# Patient Record
Sex: FEMALE | Race: WHITE | NOT HISPANIC OR LATINO | Employment: OTHER | URBAN - METROPOLITAN AREA
[De-identification: names, ages, dates, MRNs, and addresses within clinical notes are randomized per-mention and may not be internally consistent; named-entity substitution may affect disease eponyms.]

---

## 2018-04-09 ENCOUNTER — ESTABLISHED COMPREHENSIVE EXAM (OUTPATIENT)
Dept: URBAN - METROPOLITAN AREA CLINIC 40 | Facility: CLINIC | Age: 58
End: 2018-04-09

## 2018-04-09 DIAGNOSIS — H52.4: ICD-10-CM

## 2018-04-09 DIAGNOSIS — H52.203: ICD-10-CM

## 2018-04-09 DIAGNOSIS — H52.03: ICD-10-CM

## 2018-04-09 PROCEDURE — 92015 DETERMINE REFRACTIVE STATE: CPT

## 2018-04-09 PROCEDURE — 92499OP2 OPTOMAP RETINAL SCREENING BOTH EYES

## 2018-04-09 PROCEDURE — 92014 COMPRE OPH EXAM EST PT 1/>: CPT

## 2018-04-09 ASSESSMENT — KERATOMETRY
OD_AXISANGLE2_DEGREES: 095
OS_K1POWER_DIOPTERS: 48.00
OS_AXISANGLE2_DEGREES: 053
OD_K1POWER_DIOPTERS: 47.75
OD_AXISANGLE_DEGREES: 005
OS_K2POWER_DIOPTERS: 47.25
OS_AXISANGLE_DEGREES: 143
OD_K2POWER_DIOPTERS: 46.75

## 2018-04-09 ASSESSMENT — VISUAL ACUITY
OU_CC: J1
OD_CC: J1
OD_CC: 20/20
OS_CC: 20/25
OU_CC: 20/20
OU_SC: 20/25
OS_CC: J1

## 2018-04-09 ASSESSMENT — TONOMETRY
OS_IOP_MMHG: 16
OD_IOP_MMHG: 16

## 2018-04-16 ENCOUNTER — APPOINTMENT (RX ONLY)
Dept: URBAN - METROPOLITAN AREA CLINIC 48 | Facility: CLINIC | Age: 58
Setting detail: DERMATOLOGY
End: 2018-04-16

## 2018-04-16 ENCOUNTER — RX ONLY (OUTPATIENT)
Age: 58
Setting detail: RX ONLY
End: 2018-04-16

## 2018-04-16 DIAGNOSIS — L82.1 OTHER SEBORRHEIC KERATOSIS: ICD-10-CM

## 2018-04-16 DIAGNOSIS — D22 MELANOCYTIC NEVI: ICD-10-CM

## 2018-04-16 DIAGNOSIS — D18.0 HEMANGIOMA: ICD-10-CM

## 2018-04-16 DIAGNOSIS — L57.8 OTHER SKIN CHANGES DUE TO CHRONIC EXPOSURE TO NONIONIZING RADIATION: ICD-10-CM

## 2018-04-16 DIAGNOSIS — Z87.2 PERSONAL HISTORY OF DISEASES OF THE SKIN AND SUBCUTANEOUS TISSUE: ICD-10-CM

## 2018-04-16 DIAGNOSIS — L55.9 SUNBURN, UNSPECIFIED: ICD-10-CM

## 2018-04-16 PROBLEM — D48.5 NEOPLASM OF UNCERTAIN BEHAVIOR OF SKIN: Status: ACTIVE | Noted: 2018-04-16

## 2018-04-16 PROBLEM — D18.01 HEMANGIOMA OF SKIN AND SUBCUTANEOUS TISSUE: Status: ACTIVE | Noted: 2018-04-16

## 2018-04-16 PROBLEM — L70.0 ACNE VULGARIS: Status: ACTIVE | Noted: 2018-04-16

## 2018-04-16 PROBLEM — D22.5 MELANOCYTIC NEVI OF TRUNK: Status: ACTIVE | Noted: 2018-04-16

## 2018-04-16 PROCEDURE — ? COUNSELING

## 2018-04-16 PROCEDURE — 99203 OFFICE O/P NEW LOW 30 MIN: CPT

## 2018-04-16 ASSESSMENT — LOCATION SIMPLE DESCRIPTION DERM
LOCATION SIMPLE: LEFT BREAST
LOCATION SIMPLE: RIGHT SHOULDER
LOCATION SIMPLE: RIGHT FOREHEAD
LOCATION SIMPLE: ABDOMEN
LOCATION SIMPLE: RIGHT BREAST

## 2018-04-16 ASSESSMENT — LOCATION DETAILED DESCRIPTION DERM
LOCATION DETAILED: RIGHT MEDIAL BREAST 12-1:00 REGION
LOCATION DETAILED: LEFT LATERAL ABDOMEN
LOCATION DETAILED: RIGHT ANTERIOR SHOULDER
LOCATION DETAILED: EPIGASTRIC SKIN
LOCATION DETAILED: RIGHT INFERIOR MEDIAL FOREHEAD
LOCATION DETAILED: LEFT MEDIAL BREAST 11-12:00 REGION
LOCATION DETAILED: PERIUMBILICAL SKIN

## 2018-04-16 ASSESSMENT — LOCATION ZONE DERM
LOCATION ZONE: ARM
LOCATION ZONE: TRUNK
LOCATION ZONE: FACE

## 2018-10-16 ENCOUNTER — APPOINTMENT (RX ONLY)
Dept: URBAN - METROPOLITAN AREA CLINIC 48 | Facility: CLINIC | Age: 58
Setting detail: DERMATOLOGY
End: 2018-10-16

## 2018-10-16 DIAGNOSIS — D22 MELANOCYTIC NEVI: ICD-10-CM

## 2018-10-16 DIAGNOSIS — L57.8 OTHER SKIN CHANGES DUE TO CHRONIC EXPOSURE TO NONIONIZING RADIATION: ICD-10-CM

## 2018-10-16 DIAGNOSIS — L82.1 OTHER SEBORRHEIC KERATOSIS: ICD-10-CM

## 2018-10-16 DIAGNOSIS — D18.0 HEMANGIOMA: ICD-10-CM

## 2018-10-16 PROBLEM — D22.5 MELANOCYTIC NEVI OF TRUNK: Status: ACTIVE | Noted: 2018-10-16

## 2018-10-16 PROBLEM — D18.01 HEMANGIOMA OF SKIN AND SUBCUTANEOUS TISSUE: Status: ACTIVE | Noted: 2018-10-16

## 2018-10-16 PROCEDURE — 99214 OFFICE O/P EST MOD 30 MIN: CPT

## 2018-10-16 PROCEDURE — ? COUNSELING

## 2018-10-16 ASSESSMENT — LOCATION DETAILED DESCRIPTION DERM
LOCATION DETAILED: EPIGASTRIC SKIN
LOCATION DETAILED: RIGHT INFERIOR MEDIAL FOREHEAD
LOCATION DETAILED: PERIUMBILICAL SKIN
LOCATION DETAILED: LEFT LATERAL ABDOMEN

## 2018-10-16 ASSESSMENT — LOCATION ZONE DERM
LOCATION ZONE: TRUNK
LOCATION ZONE: FACE

## 2018-10-16 ASSESSMENT — LOCATION SIMPLE DESCRIPTION DERM
LOCATION SIMPLE: ABDOMEN
LOCATION SIMPLE: RIGHT FOREHEAD

## 2019-07-16 ENCOUNTER — ESTABLISHED COMPREHENSIVE EXAM (OUTPATIENT)
Dept: URBAN - METROPOLITAN AREA CLINIC 40 | Facility: CLINIC | Age: 59
End: 2019-07-16

## 2019-07-16 DIAGNOSIS — H52.4: ICD-10-CM

## 2019-07-16 DIAGNOSIS — H52.203: ICD-10-CM

## 2019-07-16 DIAGNOSIS — H52.03: ICD-10-CM

## 2019-07-16 PROCEDURE — 92015 DETERMINE REFRACTIVE STATE: CPT

## 2019-07-16 PROCEDURE — 92014 COMPRE OPH EXAM EST PT 1/>: CPT

## 2019-07-16 ASSESSMENT — KERATOMETRY
OS_K1POWER_DIOPTERS: 48
OS_AXISANGLE2_DEGREES: 73
OD_AXISANGLE2_DEGREES: 102
OS_AXISANGLE2_DEGREES: 053
OD_K1POWER_DIOPTERS: 47.75
OD_AXISANGLE_DEGREES: 12
OS_K2POWER_DIOPTERS: 47
OD_AXISANGLE_DEGREES: 005
OS_K1POWER_DIOPTERS: 48.00
OD_K2POWER_DIOPTERS: 46.75
OS_AXISANGLE_DEGREES: 143
OS_K2POWER_DIOPTERS: 47.25
OD_AXISANGLE2_DEGREES: 095
OS_AXISANGLE_DEGREES: 163
OD_K2POWER_DIOPTERS: 46.5

## 2019-07-16 ASSESSMENT — VISUAL ACUITY
OD_CC: 20/25
OS_CC: 20/20
OS_CC: J1+
OS_SC: 20/30
OU_CC: J1+
OD_SC: 20/40
OU_CC: 20/20-1
OU_SC: 20/25
OD_CC: J2

## 2019-07-16 ASSESSMENT — TONOMETRY
OD_IOP_MMHG: 16
OS_IOP_MMHG: 16

## 2020-02-11 ENCOUNTER — APPOINTMENT (RX ONLY)
Dept: URBAN - METROPOLITAN AREA CLINIC 48 | Facility: CLINIC | Age: 60
Setting detail: DERMATOLOGY
End: 2020-02-11

## 2020-02-11 DIAGNOSIS — D22 MELANOCYTIC NEVI: ICD-10-CM

## 2020-02-11 DIAGNOSIS — L57.8 OTHER SKIN CHANGES DUE TO CHRONIC EXPOSURE TO NONIONIZING RADIATION: ICD-10-CM

## 2020-02-11 DIAGNOSIS — D18.0 HEMANGIOMA: ICD-10-CM

## 2020-02-11 DIAGNOSIS — L82.1 OTHER SEBORRHEIC KERATOSIS: ICD-10-CM

## 2020-02-11 PROBLEM — D22.5 MELANOCYTIC NEVI OF TRUNK: Status: ACTIVE | Noted: 2020-02-11

## 2020-02-11 PROBLEM — D18.01 HEMANGIOMA OF SKIN AND SUBCUTANEOUS TISSUE: Status: ACTIVE | Noted: 2020-02-11

## 2020-02-11 PROBLEM — D23.5 OTHER BENIGN NEOPLASM OF SKIN OF TRUNK: Status: ACTIVE | Noted: 2020-02-11

## 2020-02-11 PROCEDURE — ? COUNSELING

## 2020-02-11 PROCEDURE — 99214 OFFICE O/P EST MOD 30 MIN: CPT

## 2020-02-11 ASSESSMENT — LOCATION DETAILED DESCRIPTION DERM
LOCATION DETAILED: EPIGASTRIC SKIN
LOCATION DETAILED: LEFT LATERAL ABDOMEN
LOCATION DETAILED: RIGHT INFERIOR MEDIAL FOREHEAD
LOCATION DETAILED: PERIUMBILICAL SKIN

## 2020-02-11 ASSESSMENT — LOCATION ZONE DERM
LOCATION ZONE: TRUNK
LOCATION ZONE: FACE

## 2020-02-11 ASSESSMENT — LOCATION SIMPLE DESCRIPTION DERM
LOCATION SIMPLE: RIGHT FOREHEAD
LOCATION SIMPLE: ABDOMEN

## 2021-02-05 ENCOUNTER — PREPPED CHART (OUTPATIENT)
Dept: URBAN - METROPOLITAN AREA CLINIC 40 | Facility: CLINIC | Age: 61
End: 2021-02-05

## 2021-02-05 ASSESSMENT — KERATOMETRY
OS_K1POWER_DIOPTERS: 48
OD_K2POWER_DIOPTERS: 46.75
OD_AXISANGLE2_DEGREES: 095
OS_K2POWER_DIOPTERS: 47.25
OS_K2POWER_DIOPTERS: 47
OD_K2POWER_DIOPTERS: 46.5
OS_AXISANGLE_DEGREES: 143
OS_K1POWER_DIOPTERS: 48.00
OS_AXISANGLE2_DEGREES: 73
OD_AXISANGLE2_DEGREES: 102
OS_AXISANGLE2_DEGREES: 053
OD_AXISANGLE_DEGREES: 12
OD_AXISANGLE_DEGREES: 005
OD_K1POWER_DIOPTERS: 47.75
OS_AXISANGLE_DEGREES: 163

## 2021-02-16 ENCOUNTER — APPOINTMENT (RX ONLY)
Dept: URBAN - METROPOLITAN AREA CLINIC 48 | Facility: CLINIC | Age: 61
Setting detail: DERMATOLOGY
End: 2021-02-16

## 2021-02-16 VITALS — TEMPERATURE: 97.9 F

## 2021-02-16 DIAGNOSIS — L82.1 OTHER SEBORRHEIC KERATOSIS: ICD-10-CM

## 2021-02-16 DIAGNOSIS — L57.8 OTHER SKIN CHANGES DUE TO CHRONIC EXPOSURE TO NONIONIZING RADIATION: ICD-10-CM

## 2021-02-16 DIAGNOSIS — D22 MELANOCYTIC NEVI: ICD-10-CM

## 2021-02-16 DIAGNOSIS — D18.0 HEMANGIOMA: ICD-10-CM

## 2021-02-16 DIAGNOSIS — L82.0 INFLAMED SEBORRHEIC KERATOSIS: ICD-10-CM

## 2021-02-16 DIAGNOSIS — L85.3 XEROSIS CUTIS: ICD-10-CM

## 2021-02-16 PROBLEM — D22.5 MELANOCYTIC NEVI OF TRUNK: Status: ACTIVE | Noted: 2021-02-16

## 2021-02-16 PROBLEM — D18.01 HEMANGIOMA OF SKIN AND SUBCUTANEOUS TISSUE: Status: ACTIVE | Noted: 2021-02-16

## 2021-02-16 PROCEDURE — ? COUNSELING

## 2021-02-16 PROCEDURE — 99214 OFFICE O/P EST MOD 30 MIN: CPT | Mod: 25

## 2021-02-16 PROCEDURE — ? PRESCRIPTION MEDICATION MANAGEMENT

## 2021-02-16 PROCEDURE — 17110 DESTRUCTION B9 LES UP TO 14: CPT

## 2021-02-16 PROCEDURE — ? SUNSCREEN RECOMMENDATIONS

## 2021-02-16 PROCEDURE — ? LIQUID NITROGEN

## 2021-02-16 ASSESSMENT — LOCATION SIMPLE DESCRIPTION DERM
LOCATION SIMPLE: ABDOMEN
LOCATION SIMPLE: LEFT CHEEK
LOCATION SIMPLE: RIGHT FOREHEAD

## 2021-02-16 ASSESSMENT — LOCATION DETAILED DESCRIPTION DERM
LOCATION DETAILED: EPIGASTRIC SKIN
LOCATION DETAILED: LEFT SUPERIOR LATERAL MALAR CHEEK
LOCATION DETAILED: PERIUMBILICAL SKIN
LOCATION DETAILED: RIGHT INFERIOR MEDIAL FOREHEAD
LOCATION DETAILED: LEFT LATERAL ABDOMEN

## 2021-02-16 ASSESSMENT — LOCATION ZONE DERM
LOCATION ZONE: FACE
LOCATION ZONE: TRUNK

## 2021-02-16 NOTE — PROCEDURE: MIPS QUALITY
Quality 130: Documentation Of Current Medications In The Medical Record: Current Medications Documented
Additional Notes: Underage for pneumonia vaccine
Quality 111:Pneumonia Vaccination Status For Older Adults: Pneumococcal Vaccination not Administered or Previously Received, Reason not Otherwise Specified
Detail Level: Detailed
Quality 226: Preventive Care And Screening: Tobacco Use: Screening And Cessation Intervention: Patient screened for tobacco use and is an ex/non-smoker

## 2021-02-16 NOTE — PROCEDURE: PRESCRIPTION MEDICATION MANAGEMENT
Plan: Discussed otc Amlactin vs RX LacHydrin \\nWill call if wants rx\\nDiscussed that this can smooth SKs
Render In Strict Bullet Format?: No
Detail Level: Zone

## 2021-04-27 ENCOUNTER — ESTABLISHED COMPREHENSIVE EXAM (OUTPATIENT)
Dept: URBAN - METROPOLITAN AREA CLINIC 40 | Facility: CLINIC | Age: 61
End: 2021-04-27

## 2021-04-27 DIAGNOSIS — D31.31: ICD-10-CM

## 2021-04-27 DIAGNOSIS — H52.4: ICD-10-CM

## 2021-04-27 DIAGNOSIS — H52.203: ICD-10-CM

## 2021-04-27 DIAGNOSIS — H52.03: ICD-10-CM

## 2021-04-27 DIAGNOSIS — H04.123: ICD-10-CM

## 2021-04-27 PROCEDURE — 92015 DETERMINE REFRACTIVE STATE: CPT

## 2021-04-27 PROCEDURE — 92499OP2 OPTOMAP RETINAL SCREENING BOTH EYES

## 2021-04-27 PROCEDURE — 92014 COMPRE OPH EXAM EST PT 1/>: CPT

## 2021-04-27 ASSESSMENT — KERATOMETRY
OD_K2POWER_DIOPTERS: 46.5
OS_AXISANGLE2_DEGREES: 73
OD_AXISANGLE_DEGREES: 12
OS_K2POWER_DIOPTERS: 47.25
OS_AXISANGLE2_DEGREES: 053
OS_K1POWER_DIOPTERS: 48.00
OS_AXISANGLE_DEGREES: 143
OD_AXISANGLE_DEGREES: 005
OS_K1POWER_DIOPTERS: 48
OS_AXISANGLE_DEGREES: 163
OD_K2POWER_DIOPTERS: 46.75
OS_K2POWER_DIOPTERS: 47
OD_AXISANGLE2_DEGREES: 102
OD_K1POWER_DIOPTERS: 47.75
OD_AXISANGLE2_DEGREES: 095

## 2021-04-27 ASSESSMENT — TONOMETRY
OD_IOP_MMHG: 14
OS_IOP_MMHG: 14

## 2021-04-27 ASSESSMENT — VISUAL ACUITY
OU_CC: 20/20
OS_CC: J3
OD_CC: 20/25
OU_CC: J2
OD_CC: J3
OS_CC: 20/25

## 2022-02-08 ENCOUNTER — APPOINTMENT (RX ONLY)
Dept: URBAN - METROPOLITAN AREA CLINIC 48 | Facility: CLINIC | Age: 62
Setting detail: DERMATOLOGY
End: 2022-02-08

## 2022-02-08 DIAGNOSIS — D22 MELANOCYTIC NEVI: ICD-10-CM

## 2022-02-08 DIAGNOSIS — L82.1 OTHER SEBORRHEIC KERATOSIS: ICD-10-CM

## 2022-02-08 DIAGNOSIS — L57.8 OTHER SKIN CHANGES DUE TO CHRONIC EXPOSURE TO NONIONIZING RADIATION: ICD-10-CM

## 2022-02-08 DIAGNOSIS — D18.0 HEMANGIOMA: ICD-10-CM

## 2022-02-08 PROBLEM — D22.5 MELANOCYTIC NEVI OF TRUNK: Status: ACTIVE | Noted: 2022-02-08

## 2022-02-08 PROBLEM — D18.01 HEMANGIOMA OF SKIN AND SUBCUTANEOUS TISSUE: Status: ACTIVE | Noted: 2022-02-08

## 2022-02-08 PROCEDURE — ? COUNSELING

## 2022-02-08 PROCEDURE — ? SUNSCREEN RECOMMENDATIONS

## 2022-02-08 PROCEDURE — 99213 OFFICE O/P EST LOW 20 MIN: CPT

## 2022-02-08 ASSESSMENT — LOCATION DETAILED DESCRIPTION DERM
LOCATION DETAILED: EPIGASTRIC SKIN
LOCATION DETAILED: RIGHT INFERIOR MEDIAL FOREHEAD
LOCATION DETAILED: LEFT LATERAL ABDOMEN
LOCATION DETAILED: PERIUMBILICAL SKIN

## 2022-02-08 ASSESSMENT — LOCATION ZONE DERM
LOCATION ZONE: FACE
LOCATION ZONE: TRUNK

## 2022-02-08 ASSESSMENT — LOCATION SIMPLE DESCRIPTION DERM
LOCATION SIMPLE: RIGHT FOREHEAD
LOCATION SIMPLE: ABDOMEN

## 2022-02-08 NOTE — PROCEDURE: SUNSCREEN RECOMMENDATIONS
Detail Level: Generalized
Products Recommended: Any otc sunscreen containing Zinc or Titanium.
General Sunscreen Counseling: I recommended a broad spectrum sunscreen with a SPF of 30 or higher. I explained that SPF 30 sunscreens block approximately 97 percent of the sun's harmful rays. Sunscreens should be applied at least 15 minutes prior to expected sun exposure and then every 2 hours after that as long as sun exposure continues. If swimming or exercising sunscreen should be reapplied every 45 minutes to an hour after getting wet or sweating. One ounce, or the equivalent of a shot glass full of sunscreen, is adequate to protect the skin not covered by a bathing suit. I also recommended a lip balm with a sunscreen as well. Sun protective clothing can be used in lieu of sunscreen but must be worn the entire time you are exposed to the sun's rays.

## 2022-05-03 ENCOUNTER — COMPREHENSIVE EXAM (OUTPATIENT)
Dept: URBAN - METROPOLITAN AREA CLINIC 40 | Facility: CLINIC | Age: 62
End: 2022-05-03

## 2022-05-03 DIAGNOSIS — H52.4: ICD-10-CM

## 2022-05-03 DIAGNOSIS — H04.123: ICD-10-CM

## 2022-05-03 DIAGNOSIS — H52.203: ICD-10-CM

## 2022-05-03 DIAGNOSIS — H52.03: ICD-10-CM

## 2022-05-03 DIAGNOSIS — D31.31: ICD-10-CM

## 2022-05-03 PROCEDURE — 92015 DETERMINE REFRACTIVE STATE: CPT

## 2022-05-03 PROCEDURE — 92014 COMPRE OPH EXAM EST PT 1/>: CPT

## 2022-05-03 ASSESSMENT — KERATOMETRY
OS_K1POWER_DIOPTERS: 48
OD_K1POWER_DIOPTERS: 46.75
OS_AXISANGLE_DEGREES: 65
OS_K1POWER_DIOPTERS: 48.00
OD_AXISANGLE2_DEGREES: 102
OS_K2POWER_DIOPTERS: 48.00
OS_K1POWER_DIOPTERS: 47.25
OD_K2POWER_DIOPTERS: 46.75
OD_AXISANGLE_DEGREES: 93
OS_AXISANGLE2_DEGREES: 73
OD_AXISANGLE2_DEGREES: 095
OS_AXISANGLE_DEGREES: 163
OS_AXISANGLE2_DEGREES: 155
OD_K2POWER_DIOPTERS: 47.75
OD_AXISANGLE_DEGREES: 12
OD_AXISANGLE_DEGREES: 005
OS_K2POWER_DIOPTERS: 47
OD_K1POWER_DIOPTERS: 47.75
OS_K2POWER_DIOPTERS: 47.25
OD_AXISANGLE2_DEGREES: 003
OS_AXISANGLE2_DEGREES: 053
OS_AXISANGLE_DEGREES: 143
OD_K2POWER_DIOPTERS: 46.5

## 2022-05-03 ASSESSMENT — VISUAL ACUITY
OU_SC: 20/70
OS_SC: 20/70
OD_SC: 20/70
OU_SC: 20/40
OD_SC: 20/40-1
OS_SC: 20/40-1

## 2022-05-03 ASSESSMENT — TONOMETRY
OD_IOP_MMHG: 15
OS_IOP_MMHG: 12

## 2022-05-05 ASSESSMENT — KERATOMETRY
OS_K1POWER_DIOPTERS: 47.25
OD_K2POWER_DIOPTERS: 47.75
OS_AXISANGLE2_DEGREES: 73
OS_K2POWER_DIOPTERS: 48.00
OS_AXISANGLE_DEGREES: 143
OD_AXISANGLE_DEGREES: 12
OD_AXISANGLE2_DEGREES: 003
OS_AXISANGLE2_DEGREES: 053
OD_AXISANGLE2_DEGREES: 095
OD_AXISANGLE2_DEGREES: 102
OS_AXISANGLE_DEGREES: 163
OD_K1POWER_DIOPTERS: 46.75
OS_AXISANGLE2_DEGREES: 155
OS_K2POWER_DIOPTERS: 47
OS_K1POWER_DIOPTERS: 48.00
OD_AXISANGLE_DEGREES: 005
OS_AXISANGLE_DEGREES: 65
OD_AXISANGLE_DEGREES: 93
OD_K2POWER_DIOPTERS: 46.5
OD_K1POWER_DIOPTERS: 47.75
OS_K2POWER_DIOPTERS: 47.25
OD_K2POWER_DIOPTERS: 46.75
OS_K1POWER_DIOPTERS: 48

## 2022-05-06 ENCOUNTER — FOLLOW UP (OUTPATIENT)
Dept: URBAN - METROPOLITAN AREA CLINIC 40 | Facility: CLINIC | Age: 62
End: 2022-05-06

## 2022-05-06 DIAGNOSIS — H02.403: ICD-10-CM

## 2022-05-06 PROCEDURE — 99213 OFFICE O/P EST LOW 20 MIN: CPT

## 2022-05-06 ASSESSMENT — VISUAL ACUITY
OD_PH: 20/20
OS_PH: 20/25
OD_SC: 20/30
OS_SC: 20/40
OU_SC: 20/30

## 2022-05-06 ASSESSMENT — TONOMETRY
OD_IOP_MMHG: 16
OS_IOP_MMHG: 15

## 2022-06-22 ENCOUNTER — ESTABLISHED PATIENT (OUTPATIENT)
Dept: URBAN - METROPOLITAN AREA CLINIC 38 | Facility: CLINIC | Age: 62
End: 2022-06-22

## 2022-06-22 DIAGNOSIS — H02.834: ICD-10-CM

## 2022-06-22 DIAGNOSIS — H02.831: ICD-10-CM

## 2022-06-22 DIAGNOSIS — H02.403: ICD-10-CM

## 2022-06-22 PROCEDURE — 92285 EXTERNAL OCULAR PHOTOGRAPHY: CPT

## 2022-06-22 PROCEDURE — 99213 OFFICE O/P EST LOW 20 MIN: CPT

## 2022-06-22 ASSESSMENT — KERATOMETRY
OS_K2POWER_DIOPTERS: 47.25
OD_K2POWER_DIOPTERS: 46.75
OD_K2POWER_DIOPTERS: 47.75
OD_K2POWER_DIOPTERS: 46.5
OS_AXISANGLE_DEGREES: 143
OS_AXISANGLE2_DEGREES: 73
OD_AXISANGLE_DEGREES: 12
OD_K1POWER_DIOPTERS: 47.75
OD_AXISANGLE_DEGREES: 93
OD_K1POWER_DIOPTERS: 46.75
OS_AXISANGLE_DEGREES: 65
OD_AXISANGLE2_DEGREES: 095
OS_AXISANGLE_DEGREES: 163
OS_AXISANGLE2_DEGREES: 053
OS_AXISANGLE2_DEGREES: 155
OS_K2POWER_DIOPTERS: 47
OS_K1POWER_DIOPTERS: 48
OS_K2POWER_DIOPTERS: 48.00
OS_K1POWER_DIOPTERS: 47.25
OS_K1POWER_DIOPTERS: 48.00
OD_AXISANGLE2_DEGREES: 003
OD_AXISANGLE2_DEGREES: 102
OD_AXISANGLE_DEGREES: 005

## 2022-06-22 ASSESSMENT — VISUAL ACUITY
OD_SC: 20/30
OS_SC: 20/40

## 2022-07-20 ENCOUNTER — TECH ONLY (OUTPATIENT)
Dept: URBAN - METROPOLITAN AREA CLINIC 39 | Facility: CLINIC | Age: 62
End: 2022-07-20

## 2022-07-20 DIAGNOSIS — H02.403: ICD-10-CM

## 2022-07-20 DIAGNOSIS — H02.831: ICD-10-CM

## 2022-07-20 DIAGNOSIS — H02.835: ICD-10-CM

## 2022-07-20 DIAGNOSIS — H02.834: ICD-10-CM

## 2022-07-20 DIAGNOSIS — H02.832: ICD-10-CM

## 2022-07-20 PROCEDURE — 92082 INTERMEDIATE VISUAL FIELD XM: CPT

## 2022-07-20 PROCEDURE — 99211T TECH SERVICE

## 2022-07-20 ASSESSMENT — KERATOMETRY
OD_AXISANGLE2_DEGREES: 102
OS_K2POWER_DIOPTERS: 47.25
OD_K1POWER_DIOPTERS: 47.75
OS_AXISANGLE2_DEGREES: 053
OD_AXISANGLE_DEGREES: 005
OS_K1POWER_DIOPTERS: 47.25
OS_K1POWER_DIOPTERS: 48
OD_K2POWER_DIOPTERS: 46.75
OS_AXISANGLE_DEGREES: 163
OD_AXISANGLE2_DEGREES: 095
OS_AXISANGLE_DEGREES: 143
OS_AXISANGLE_DEGREES: 65
OS_AXISANGLE2_DEGREES: 73
OD_AXISANGLE2_DEGREES: 003
OS_K1POWER_DIOPTERS: 48.00
OD_AXISANGLE_DEGREES: 12
OS_K2POWER_DIOPTERS: 47
OD_AXISANGLE_DEGREES: 93
OD_K2POWER_DIOPTERS: 46.5
OS_AXISANGLE2_DEGREES: 155
OD_K1POWER_DIOPTERS: 46.75
OS_K2POWER_DIOPTERS: 48.00
OD_K2POWER_DIOPTERS: 47.75

## 2022-08-09 ENCOUNTER — APPOINTMENT (RX ONLY)
Dept: URBAN - METROPOLITAN AREA CLINIC 130 | Facility: CLINIC | Age: 62
Setting detail: DERMATOLOGY
End: 2022-08-09

## 2022-08-09 DIAGNOSIS — D22 MELANOCYTIC NEVI: ICD-10-CM

## 2022-08-09 DIAGNOSIS — D49.2 NEOPLASM OF UNSPECIFIED BEHAVIOR OF BONE, SOFT TISSUE, AND SKIN: ICD-10-CM

## 2022-08-09 DIAGNOSIS — L82.1 OTHER SEBORRHEIC KERATOSIS: ICD-10-CM

## 2022-08-09 DIAGNOSIS — D18.0 HEMANGIOMA: ICD-10-CM

## 2022-08-09 DIAGNOSIS — Z87.2 PERSONAL HISTORY OF DISEASES OF THE SKIN AND SUBCUTANEOUS TISSUE: ICD-10-CM

## 2022-08-09 DIAGNOSIS — L57.8 OTHER SKIN CHANGES DUE TO CHRONIC EXPOSURE TO NONIONIZING RADIATION: ICD-10-CM

## 2022-08-09 PROBLEM — D22.5 MELANOCYTIC NEVI OF TRUNK: Status: ACTIVE | Noted: 2022-08-09

## 2022-08-09 PROBLEM — D18.01 HEMANGIOMA OF SKIN AND SUBCUTANEOUS TISSUE: Status: ACTIVE | Noted: 2022-08-09

## 2022-08-09 PROCEDURE — ? DERMTECH: PLA + TERT ADD ON ASSAY

## 2022-08-09 PROCEDURE — ? COUNSELING

## 2022-08-09 PROCEDURE — ? SUNSCREEN RECOMMENDATIONS

## 2022-08-09 PROCEDURE — 99214 OFFICE O/P EST MOD 30 MIN: CPT

## 2022-08-09 ASSESSMENT — LOCATION SIMPLE DESCRIPTION DERM
LOCATION SIMPLE: RIGHT FOREHEAD
LOCATION SIMPLE: ABDOMEN

## 2022-08-09 ASSESSMENT — LOCATION DETAILED DESCRIPTION DERM
LOCATION DETAILED: RIGHT INFERIOR MEDIAL FOREHEAD
LOCATION DETAILED: RIGHT RIB CAGE
LOCATION DETAILED: EPIGASTRIC SKIN
LOCATION DETAILED: PERIUMBILICAL SKIN
LOCATION DETAILED: LEFT LATERAL ABDOMEN

## 2022-08-09 ASSESSMENT — LOCATION ZONE DERM
LOCATION ZONE: TRUNK
LOCATION ZONE: FACE

## 2022-08-09 NOTE — PROCEDURE: DERMTECH: PLA + TERT ADD ON ASSAY
Body Location Override (Optional - Billing Will Still Be Based On Selected Body Map Location If Applicable): right upper abdomen
Detail Level: Detailed
Size Of Lesion In Cm (Optional): 0
Accession #: [id number]
Biopsy Type: RNA and DNA evaluation
Lab: -[id number]
Procedure Details: The first patch was applied to the lesion, adhesive side down, and rubbed in a circular motion 15 times. Following this the lesion was outlined with a permanent marker and the patch was then secured, adhesive side down, in the provided transport case. This process was repeated for patches 2 through 4 and then sent to [address] [place] [address] in the provided self-addressed envelope.
Render Path Notes In Note?: No
Consent: Written consent was obtained and risks were reviewed including but not limited to mild pain and skin irritation.
Post-Care Instructions: I reviewed with the patient in detail post-care instructions.
Notification Instructions: Patient will be notified of biopsy results. However, patient instructed to call the office if not contacted within 2 weeks.
Billing Type: [employer]

## 2022-08-31 ENCOUNTER — PRE-OP/H&P (OUTPATIENT)
Dept: URBAN - METROPOLITAN AREA CLINIC 39 | Facility: CLINIC | Age: 62
End: 2022-08-31

## 2022-08-31 DIAGNOSIS — H02.831: ICD-10-CM

## 2022-08-31 DIAGNOSIS — H02.834: ICD-10-CM

## 2022-08-31 DIAGNOSIS — H02.403: ICD-10-CM

## 2022-08-31 PROCEDURE — 99211HP H&P OFFICE/OUTPATIENT VISIT, EST

## 2022-08-31 ASSESSMENT — KERATOMETRY
OD_K2POWER_DIOPTERS: 46.5
OS_AXISANGLE_DEGREES: 163
OS_K2POWER_DIOPTERS: 48.00
OD_AXISANGLE_DEGREES: 005
OD_AXISANGLE2_DEGREES: 102
OS_K1POWER_DIOPTERS: 48.00
OD_AXISANGLE2_DEGREES: 095
OS_AXISANGLE_DEGREES: 65
OS_AXISANGLE2_DEGREES: 73
OD_AXISANGLE_DEGREES: 12
OD_K2POWER_DIOPTERS: 47.75
OS_K2POWER_DIOPTERS: 47
OD_K1POWER_DIOPTERS: 46.75
OS_K1POWER_DIOPTERS: 48
OS_K2POWER_DIOPTERS: 47.25
OD_K2POWER_DIOPTERS: 46.75
OS_K1POWER_DIOPTERS: 47.25
OS_AXISANGLE_DEGREES: 143
OD_AXISANGLE_DEGREES: 93
OD_AXISANGLE2_DEGREES: 003
OD_K1POWER_DIOPTERS: 47.75
OS_AXISANGLE2_DEGREES: 155
OS_AXISANGLE2_DEGREES: 053

## 2022-09-26 ENCOUNTER — SURGERY/PROCEDURE (OUTPATIENT)
Dept: URBAN - METROPOLITAN AREA SURGERY 14 | Facility: SURGERY | Age: 62
End: 2022-09-26

## 2022-09-26 ENCOUNTER — PRE-OP/H&P (OUTPATIENT)
Dept: URBAN - METROPOLITAN AREA SURGERY 14 | Facility: SURGERY | Age: 62
End: 2022-09-26

## 2022-09-26 DIAGNOSIS — H02.831: ICD-10-CM

## 2022-09-26 DIAGNOSIS — H02.834: ICD-10-CM

## 2022-09-26 PROCEDURE — 99211T TECH SERVICE

## 2022-09-26 PROCEDURE — 15823 BLEPHARP UPR EYELID XCSV SKN: CPT

## 2022-09-26 PROCEDURE — 15823F PER EYE FAT REMOVAL BILL W/ 15823

## 2022-09-26 ASSESSMENT — KERATOMETRY
OD_AXISANGLE_DEGREES: 93
OD_K1POWER_DIOPTERS: 46.75
OD_AXISANGLE2_DEGREES: 102
OS_K2POWER_DIOPTERS: 47
OS_K1POWER_DIOPTERS: 47.25
OD_K1POWER_DIOPTERS: 47.75
OD_AXISANGLE_DEGREES: 005
OD_K2POWER_DIOPTERS: 47.75
OD_AXISANGLE_DEGREES: 93
OS_AXISANGLE2_DEGREES: 155
OS_K1POWER_DIOPTERS: 48
OD_AXISANGLE2_DEGREES: 095
OS_K2POWER_DIOPTERS: 47.25
OD_AXISANGLE2_DEGREES: 003
OD_AXISANGLE2_DEGREES: 003
OD_AXISANGLE2_DEGREES: 102
OD_AXISANGLE2_DEGREES: 095
OS_K2POWER_DIOPTERS: 47
OD_K1POWER_DIOPTERS: 46.75
OS_AXISANGLE_DEGREES: 65
OS_K2POWER_DIOPTERS: 48.00
OS_K1POWER_DIOPTERS: 48.00
OD_K2POWER_DIOPTERS: 46.5
OS_AXISANGLE_DEGREES: 65
OS_AXISANGLE_DEGREES: 163
OD_AXISANGLE_DEGREES: 12
OS_AXISANGLE2_DEGREES: 155
OS_AXISANGLE2_DEGREES: 73
OD_K1POWER_DIOPTERS: 47.75
OS_K1POWER_DIOPTERS: 48.00
OS_AXISANGLE2_DEGREES: 053
OD_AXISANGLE_DEGREES: 005
OD_K2POWER_DIOPTERS: 46.75
OS_K1POWER_DIOPTERS: 47.25
OS_K2POWER_DIOPTERS: 47.25
OS_K1POWER_DIOPTERS: 48
OD_AXISANGLE_DEGREES: 12
OS_AXISANGLE2_DEGREES: 053
OS_AXISANGLE2_DEGREES: 73
OS_K2POWER_DIOPTERS: 48.00
OS_AXISANGLE_DEGREES: 163
OD_K2POWER_DIOPTERS: 46.5
OS_AXISANGLE_DEGREES: 143
OD_K2POWER_DIOPTERS: 47.75
OD_K2POWER_DIOPTERS: 46.75
OS_AXISANGLE_DEGREES: 143

## 2022-10-04 ASSESSMENT — KERATOMETRY
OS_K1POWER_DIOPTERS: 48.00
OS_K2POWER_DIOPTERS: 47
OS_AXISANGLE_DEGREES: 143
OD_AXISANGLE_DEGREES: 93
OS_K1POWER_DIOPTERS: 47.25
OD_K1POWER_DIOPTERS: 46.75
OD_AXISANGLE2_DEGREES: 102
OD_AXISANGLE2_DEGREES: 095
OD_K2POWER_DIOPTERS: 46.75
OD_AXISANGLE_DEGREES: 12
OS_AXISANGLE2_DEGREES: 73
OD_K2POWER_DIOPTERS: 46.5
OS_K1POWER_DIOPTERS: 48
OS_AXISANGLE_DEGREES: 163
OS_AXISANGLE2_DEGREES: 155
OS_K2POWER_DIOPTERS: 48.00
OD_AXISANGLE2_DEGREES: 003
OS_AXISANGLE2_DEGREES: 053
OS_AXISANGLE_DEGREES: 65
OD_AXISANGLE_DEGREES: 005
OS_K2POWER_DIOPTERS: 47.25
OD_K2POWER_DIOPTERS: 47.75
OD_K1POWER_DIOPTERS: 47.75

## 2022-10-05 ENCOUNTER — POST-OP (OUTPATIENT)
Dept: URBAN - METROPOLITAN AREA CLINIC 39 | Facility: CLINIC | Age: 62
End: 2022-10-05

## 2022-10-05 DIAGNOSIS — Z98.890: ICD-10-CM

## 2022-10-05 PROCEDURE — 99024 POSTOP FOLLOW-UP VISIT: CPT

## 2022-10-05 ASSESSMENT — VISUAL ACUITY
OS_CC: 20/40+3
OD_CC: 20/40+3

## 2022-11-03 NOTE — PROCEDURE: LIQUID NITROGEN
Patient has two bags of belongings, locked in locker. Not inventoried d/t covid.           Behavioral Health Belongings     Informed of Valuables Policy  Yes No         Item                  Qty   Clothing:     Home   Bin   Security Lock Up Room Returned   Date/Initials    Pants with strings         Shirts with strings         Shoes / boots with strings         Coats / jackets with strings         Other                               Behavioral Health Belongings List                     [id number]                          Qty       Luggage / Bags:   Home   Bin   Security Lock Up Room Returned Date/Initials    Suitcases / Luggage with straps         Gym Bags / purses with long straps         Backpack         Plastic bags (including Ziploc)         Other                     Qty   Grooming / Hygiene Items:   Home   Bin   Security Lock Up Room Returned Date/Initials    Perfume / [employer]         Mouthwash         Items in glass bottles (nail polish, makeup, etc.)         Aerosol cans (hair spray, mousse, etc         Electric razor         Disposable razor         Shaving cream         Dental floss         Metal files, nail clippers, tweezers, etc.         Hairdryer         Curling iron         Other                                                             Behavioral Health Belongings List                            [id number]                               Qty   Electronic Devices:   Home   Bin   Security Lock Up Room Returned Date/Initials    Cell phone, iPhone, Blackberry, etc.         iPod / Media player         Chargers / cords         Headphones         Other                              Qty   Smoking Items:   Home   Bin   Security Lock Up Room Returned Date/Initials    Cigarettes, cigars         Lighters         Matches         Loose tobacco for rolling cigarettes, Chew, Pipe tobacco         Tobacco pipes         Rolling Papers         Other                                                  Behavioral Health 
Belongings List                          [id number]                                                   .                                .            Qty   Cards, Keys, Valuables:   Home   Bin   Security Lock Up Room Returned Date/Initials    Keys         Wallet / Purse         Cash (Encourage  to home or Loss Prevention)         Credit Cards (Encourage home or Loss Prevention)         Checkbook (Encourage home or Loss Prevention)         Jewelry - Specify         Other                              Qty   Other / Various Sharps:   Home   Bin   Security Lock Up Room Returned Date/Initials    Brace         Cane         Contacts         Dentures U / L         Hearing Aide L / R         Partials / Retainer         Prothesis         Carlos Eduardo         Walker         W/C         Medication         Other                                                                             Behavioral Health Belongings List                            [id number]                           Qty   Other Misc Items   Home   Bin   Security Lock Up Room Returned Date/Initials                                                                    Signature at Admission ___________________________________________Date: _________    Signature at Update ______________________________________________Date: _________    Signature at Discharge ____________________________________________Date: _________                                                                    Behavioral Health Belongings List                       [id number]         
Medical Necessity Clause: This procedure was medically necessary because the lesions that were treated were: extremely itchy, enlarging and causing irritation.
Medical Necessity Information: It is in your best interest to select a reason for this procedure from the list below. All of these items fulfill various CMS LCD requirements except the new and changing color options.
Duration Of Freeze Thaw-Cycle (Seconds): 5-10
Render Note In Bullet Format When Appropriate: No
Number Of Freeze-Thaw Cycles: 1 freeze-thaw cycle
Post-Care Instructions: I reviewed with the patient in detail post-care instructions. Patient is to wear sunprotection, and avoid picking at any of the treated lesions. Pt may apply Vaseline to crusted or scabbing areas.
Detail Level: Zone
Consent: The patient's consent was obtained including but not limited to risks of crusting, scabbing, blistering, scarring, darker or lighter pigmentary change, recurrence, incomplete removal and infection.

## 2024-04-02 ENCOUNTER — ESTABLISHED PATIENT (OUTPATIENT)
Dept: URBAN - METROPOLITAN AREA CLINIC 40 | Facility: CLINIC | Age: 64
End: 2024-04-02

## 2024-04-02 DIAGNOSIS — H52.4: ICD-10-CM

## 2024-04-02 DIAGNOSIS — H52.03: ICD-10-CM

## 2024-04-02 DIAGNOSIS — H04.123: ICD-10-CM

## 2024-04-02 DIAGNOSIS — D31.31: ICD-10-CM

## 2024-04-02 PROCEDURE — 92015 DETERMINE REFRACTIVE STATE: CPT

## 2024-04-02 PROCEDURE — 92014 COMPRE OPH EXAM EST PT 1/>: CPT

## 2024-04-02 ASSESSMENT — KERATOMETRY
OD_AXISANGLE_DEGREES: 005
OD_AXISANGLE2_DEGREES: 003
OD_AXISANGLE2_DEGREES: 102
OS_AXISANGLE_DEGREES: 163
OD_AXISANGLE_DEGREES: 12
OS_AXISANGLE2_DEGREES: 155
OS_AXISANGLE2_DEGREES: 053
OD_K2POWER_DIOPTERS: 47.75
OD_K1POWER_DIOPTERS: 46.75
OS_K1POWER_DIOPTERS: 47.25
OD_AXISANGLE2_DEGREES: 095
OS_K1POWER_DIOPTERS: 48.00
OS_AXISANGLE_DEGREES: 65
OD_K1POWER_DIOPTERS: 47.75
OS_AXISANGLE2_DEGREES: 73
OS_K2POWER_DIOPTERS: 47
OS_K2POWER_DIOPTERS: 48.00
OD_AXISANGLE_DEGREES: 93
OS_K2POWER_DIOPTERS: 47.25
OS_K1POWER_DIOPTERS: 48
OD_K2POWER_DIOPTERS: 46.5
OD_K2POWER_DIOPTERS: 46.75
OS_AXISANGLE_DEGREES: 143

## 2024-04-02 ASSESSMENT — VISUAL ACUITY
OU_SC: >J12
OD_SC: >J12
OS_SC: 20/50-1
OS_CC: 20/25-1
OS_CC: J8
OS_SC: >J12
OD_SC: 20/40
OU_CC: J2
OU_CC: 20/25
OD_PH: 20/25+1
OU_SC: 20/30-2
OD_CC: J8
OD_CC: 20/25
OS_PH: 20/25+2

## 2024-04-02 ASSESSMENT — TONOMETRY
OD_IOP_MMHG: 16
OS_IOP_MMHG: 16

## 2025-04-03 ENCOUNTER — COMPREHENSIVE EXAM (OUTPATIENT)
Age: 65
End: 2025-04-03

## 2025-04-03 DIAGNOSIS — D31.31: ICD-10-CM

## 2025-04-03 DIAGNOSIS — H25.813: ICD-10-CM

## 2025-04-03 DIAGNOSIS — H04.123: ICD-10-CM

## 2025-04-03 DIAGNOSIS — H43.391: ICD-10-CM

## 2025-04-03 PROCEDURE — 92015 DETERMINE REFRACTIVE STATE: CPT

## 2025-04-03 PROCEDURE — 92014 COMPRE OPH EXAM EST PT 1/>: CPT
